# Patient Record
Sex: MALE | Race: WHITE | URBAN - NONMETROPOLITAN AREA
[De-identification: names, ages, dates, MRNs, and addresses within clinical notes are randomized per-mention and may not be internally consistent; named-entity substitution may affect disease eponyms.]

---

## 2017-06-01 ENCOUNTER — HOSPITAL ENCOUNTER (EMERGENCY)
Facility: HOSPITAL | Age: 22
Discharge: HOME | End: 2017-06-01
Attending: PHYSICIAN ASSISTANT | Admitting: PHYSICIAN ASSISTANT
Payer: COMMERCIAL

## 2017-06-01 VITALS
RESPIRATION RATE: 16 BRPM | OXYGEN SATURATION: 98 % | DIASTOLIC BLOOD PRESSURE: 93 MMHG | SYSTOLIC BLOOD PRESSURE: 137 MMHG | TEMPERATURE: 97.7 F

## 2017-06-01 DIAGNOSIS — L03.319 CELLULITIS AND ABSCESS OF TRUNK: ICD-10-CM

## 2017-06-01 DIAGNOSIS — L02.219 CELLULITIS AND ABSCESS OF TRUNK: ICD-10-CM

## 2017-06-01 PROCEDURE — 99283 EMERGENCY DEPT VISIT LOW MDM: CPT

## 2017-06-01 PROCEDURE — 99283 EMERGENCY DEPT VISIT LOW MDM: CPT | Performed by: PHYSICIAN ASSISTANT

## 2017-06-01 NOTE — ED AVS SNAPSHOT
[facility]    [address] [place] [address]    Phone:  [phone]                                       Bruce Palacios   MRN: [id number]    Department:  [facility]   Date of Visit:  6/1/2017           After Visit Summary Signature Page     I have received my discharge instructions, and my questions have been answered. I have discussed any challenges I see with this plan with the nurse or doctor.    ..........................................................................................................................................  Patient/Patient Representative Signature      ..........................................................................................................................................  Patient Representative Print Name and Relationship to Patient    ..................................................               ................................................  Date                                            Time    ..........................................................................................................................................  Reviewed by Signature/Title    ...................................................              ..............................................  Date                                                            Time

## 2017-06-01 NOTE — ED AVS SNAPSHOT
[facility]    [address] [address]    Phone:  [phone]                                       Bruce Palacios   MRN: [id number]    Department:  [facility]   Date of Visit:  6/1/2017           Patient Information     Date Of Birth          1995        Your diagnoses for this visit were:     Cellulitis and abscess of trunk        You were seen by Sierra Odom PA-C.      Follow-up Information     Follow up with Toney Hair MD In 1 week.    Specialty:  Surgery    Contact information:    [facility]  [address] [facility] [address] [phone]          Follow up with [facility].    Specialty:  EMERGENCY MEDICINE    Why:  If symptoms worsen    Contact information:    [address] [address] [phone]    Additional information:    From [facility]: Take [address]. Turn left at [address]. Turn left at the first stoplight on [address] [place] [address]. At the first stop sign, take a right onto [address]. Take a left into the parking lot and continue through until you reach the North enterance of the building.       From [place]: Take [address]. Take the [address] ramp towards [place]. Turn left onto [address]. Take a slight right onto [address]. Turn left at the first stoplight on [address]. At the first stop sign, take a right onto [address]. Take a left into the parking lot and continue through until you reach the North enterance of the building.       From [place]: Take [address]. Take a right at [address]. At the first stop sign, take a right onto [address]. Take a left into the parking lot and continue through until you reach the North enterance of the building.         Discharge Instructions       Clean with mild soap in the shower. Dry out your belly button thoroughly after you shower and avoid picking at it. Our general  "surgery department will be calling you to set up an appointment to be seen. Please return here sooner with any new or worsening symptoms.         ED Discharge Orders     GENERAL SURG ADULT REFERRAL       Your provider has referred you to: [facility]: Deion Paulbing [phone]   [email or web address]    Please be aware that coverage of these services is subject to the terms and limitations of your health insurance plan.  Call member services at your health plan with any benefit or coverage questions.      Please bring the following with you to your appointment:    (1) Any X-Rays, CTs or MRIs which have been performed.  Contact the facility where they were done to arrange for  prior to your scheduled appointment.   (2) List of current medications   (3) This referral request   (4) Any documents/labs given to you for this referral                     Review of your medicines      Notice     You have not been prescribed any medications.            Orders Needing Specimen Collection     None      Pending Results     No orders found from 5/30/2017 to 6/2/2017.            Pending Culture Results     No orders found from 5/30/2017 to 6/2/2017.            Thank you for choosing [facility]       Thank you for choosing [facility] for your care. Our goal is always to provide you with excellent care. Hearing back from our patients is one way we can continue to improve our services. Please take a few minutes to complete the written survey that you may receive in the mail after you visit with us. Thank you!        [facility] Information     [facility] lets you send messages to your doctor, view your test results, renew your prescriptions, schedule appointments and more. To sign up, go to [email or web address] . Click on \"Log in\" on the left side of the screen, which will take you to the Welcome page. Then click on \"Sign up Now\" on the right side of the page.     You " will be asked to enter the access code listed below, as well as some personal information. Please follow the directions to create your username and password.     Your access code is: [email or web address]  Expires: 2017  9:47 PM     Your access code will  in 90 days. If you need help or a new code, please call your [facility] clinic or [phone].        Care EveryWhere ID     This is your Care EveryWhere ID. This could be used by other organizations to access your [facility] medical records  [id number]        After Visit Summary       This is your record. Keep this with you and show to your community pharmacist(s) and doctor(s) at your next visit.

## 2017-06-02 NOTE — ED PROVIDER NOTES
History     Chief Complaint   Patient presents with     Abdominal Pain     c/o lump on abd, notes was on an antibiotic in march for same. c/o pain and drainage. notes odor     HPI  Bruce Palacios is a 21 year old male who presents with umbilical lesion that has been draining yellow, bloody drainage off and on for 2 months. Was seen in [place] for this in march and was apparently diagnosed with impetigo and put on an antibiotic for 7 days with no improvement. Pt is a  and has to wear a bullet proof vest which is tight fitting, which is causing him discomfort. He denies fevers/chills.     I have reviewed the Medications, Allergies, Past Medical and Surgical History, and Social History in the Epic system.    Review of Systems   All other systems reviewed and are negative.    Past Medical History: No past medical history on file.    No past surgical history on file.    Social History     Social History     Marital status: Single     Spouse name: N/A     Number of children: N/A     Years of education: N/A     Occupational History     Not on file.     Social History Main Topics     Smoking status: Not on file     Smokeless tobacco: Not on file     Alcohol use Not on file     Drug use: Not on file     Sexual activity: Not on file     Other Topics Concern     Not on file     Social History Narrative       There are no discharge medications for this patient.      Allergies: Review of patient's allergies indicates no known allergies.    Physical Exam   BP: 137/93  Heart Rate: 66  Temp: 97.7  F (36.5  C)  Resp: 16  SpO2: 98 %  Physical Exam   Constitutional: He is oriented to person, place, and time. He appears well-developed and well-nourished. No distress.   HENT:   Head: Normocephalic.   Cardiovascular: Normal rate.    Pulmonary/Chest: Effort normal.   Abdominal: Soft. Bowel sounds are normal.   No umbilical hernia.   Musculoskeletal: Normal range of motion.   Neurological: He is alert and oriented to  person, place, and time.   Skin: Skin is warm. Lesion (Umbliicus, see illustration. ) noted. He is not diaphoretic. No erythema.        Psychiatric: He has a normal mood and affect. His behavior is normal.   Nursing note and vitals reviewed.      ED Course     ED Course     Procedures             Labs Ordered and Resulted from Time of ED Arrival Up to the Time of Departure from the ED - No data to display    Assessments & Plan (with Medical Decision Making)   Pt presents with an interesting lesion in his umbilicus. It is about 1cm x 0.5 cm attached to the upper portion of the umbilicus which has some macerated tissue surrounding likely due to chronically moist environment. I removed a large amount of hair and debri around the lesion and dried it out. The lesion may be the remnant of an already drained abscess, as there is no fluctuance or active drainage from it currently. It doesn't appear to be a hernia. The lesion seems to extend into the soft tissue of the abdomen slightly as I can palpate a small mass just superior to the umbilicus as well, that seems attached to the lesion. I decided to order a general surgery consult and have them take a look to see what should be done. It may need biopsied. No indications for antibiotics at this time. See plan below.     Plan: Clean with mild soap in the shower. Dry out your belly button thoroughly after you shower and avoid picking at it. Our general surgery department will be calling you to set up an appointment to be seen. Please return here sooner with any new or worsening symptoms.     I have reviewed the nursing notes.    I have reviewed the findings, diagnosis, plan and need for follow up with the patient.    There are no discharge medications for this patient.      Final diagnoses:   Cellulitis and abscess of trunk       6/1/2017   [facility]     Sierra Odom PA-C  06/02/17 1420

## 2017-06-02 NOTE — DISCHARGE INSTRUCTIONS
Clean with mild soap in the shower. Dry out your belly button thoroughly after you shower and avoid picking at it. Our general surgery department will be calling you to set up an appointment to be seen. Please return here sooner with any new or worsening symptoms.

## 2017-06-02 NOTE — ED NOTES
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  Patient reported since he has been finished with the [place] he has been having a discharge from his naval area.  Reports the area is tender, denies fever. Also has a small lump on the inner part of naval area and tender. Reports that is hurts when he wears his bullet proof vest cause it rubs on that area. Reports he did see a \"minute clinic\" doctor and gave him abx for impetigo and reported that it never cleared.  No active discharge noted, but light brown dry crusties noted.   "